# Patient Record
Sex: MALE | Race: WHITE | Employment: FULL TIME | URBAN - METROPOLITAN AREA
[De-identification: names, ages, dates, MRNs, and addresses within clinical notes are randomized per-mention and may not be internally consistent; named-entity substitution may affect disease eponyms.]

---

## 2021-08-05 ENCOUNTER — ANESTHESIA EVENT (OUTPATIENT)
Dept: EMERGENCY MEDICINE | Facility: CLINIC | Age: 31
End: 2021-08-05
Payer: COMMERCIAL

## 2021-08-05 ENCOUNTER — APPOINTMENT (OUTPATIENT)
Dept: GENERAL RADIOLOGY | Facility: CLINIC | Age: 31
End: 2021-08-05
Attending: EMERGENCY MEDICINE
Payer: COMMERCIAL

## 2021-08-05 ENCOUNTER — ANESTHESIA (OUTPATIENT)
Dept: EMERGENCY MEDICINE | Facility: CLINIC | Age: 31
End: 2021-08-05
Payer: COMMERCIAL

## 2021-08-05 ENCOUNTER — HOSPITAL ENCOUNTER (EMERGENCY)
Facility: CLINIC | Age: 31
Discharge: HOME | End: 2021-08-05
Attending: EMERGENCY MEDICINE | Admitting: EMERGENCY MEDICINE
Payer: COMMERCIAL

## 2021-08-05 VITALS
HEART RATE: 64 BPM | HEIGHT: 74 IN | DIASTOLIC BLOOD PRESSURE: 69 MMHG | RESPIRATION RATE: 10 BRPM | OXYGEN SATURATION: 98 % | BODY MASS INDEX: 30.02 KG/M2 | WEIGHT: 233.9 LBS | TEMPERATURE: 97.9 F | SYSTOLIC BLOOD PRESSURE: 124 MMHG

## 2021-08-05 DIAGNOSIS — Z87.39 HISTORY OF CLOSED SHOULDER DISLOCATION: ICD-10-CM

## 2021-08-05 DIAGNOSIS — S43.016A ANTERIOR SHOULDER DISLOCATION, INITIAL ENCOUNTER: ICD-10-CM

## 2021-08-05 PROCEDURE — 250N000011 HC RX IP 250 OP 636: Performed by: NURSE ANESTHETIST, CERTIFIED REGISTERED

## 2021-08-05 PROCEDURE — 370N000003 HC ANESTHESIA WARD SERVICE

## 2021-08-05 PROCEDURE — 99285 EMERGENCY DEPT VISIT HI MDM: CPT | Mod: 25 | Performed by: EMERGENCY MEDICINE

## 2021-08-05 PROCEDURE — 73030 X-RAY EXAM OF SHOULDER: CPT | Mod: RT

## 2021-08-05 PROCEDURE — 96376 TX/PRO/DX INJ SAME DRUG ADON: CPT | Mod: 59 | Performed by: EMERGENCY MEDICINE

## 2021-08-05 PROCEDURE — 250N000011 HC RX IP 250 OP 636: Performed by: EMERGENCY MEDICINE

## 2021-08-05 PROCEDURE — 999N000065 XR SHOULDER RIGHT PORT G/E 2 VIEWS: Mod: RT

## 2021-08-05 PROCEDURE — 23650 CLTX SHO DSLC W/MNPJ WO ANES: CPT | Mod: RT | Performed by: EMERGENCY MEDICINE

## 2021-08-05 PROCEDURE — 96374 THER/PROPH/DIAG INJ IV PUSH: CPT | Mod: 59 | Performed by: EMERGENCY MEDICINE

## 2021-08-05 RX ORDER — PROPOFOL 10 MG/ML
INJECTION, EMULSION INTRAVENOUS PRN
Status: DISCONTINUED | OUTPATIENT
Start: 2021-08-05 | End: 2021-08-05

## 2021-08-05 RX ORDER — PROPOFOL 10 MG/ML
INJECTION, EMULSION INTRAVENOUS DAILY PRN
Status: COMPLETED | OUTPATIENT
Start: 2021-08-05 | End: 2021-08-05

## 2021-08-05 RX ORDER — IBUPROFEN 200 MG
400 TABLET ORAL EVERY 4 HOURS PRN
COMMUNITY

## 2021-08-05 RX ADMIN — PROPOFOL 100 MG: 10 INJECTION, EMULSION INTRAVENOUS at 20:56

## 2021-08-05 RX ADMIN — HYDROMORPHONE HYDROCHLORIDE 1 MG: 1 INJECTION, SOLUTION INTRAMUSCULAR; INTRAVENOUS; SUBCUTANEOUS at 20:31

## 2021-08-05 RX ADMIN — MIDAZOLAM 2 MG: 1 INJECTION INTRAMUSCULAR; INTRAVENOUS at 20:54

## 2021-08-05 RX ADMIN — HYDROMORPHONE HYDROCHLORIDE 1 MG: 1 INJECTION, SOLUTION INTRAMUSCULAR; INTRAVENOUS; SUBCUTANEOUS at 19:41

## 2021-08-05 ASSESSMENT — MIFFLIN-ST. JEOR
SCORE: 2080.71
SCORE: 2013.13

## 2021-08-05 ASSESSMENT — ENCOUNTER SYMPTOMS
ENDOCRINE NEGATIVE: 1
EYES NEGATIVE: 1
ALLERGIC/IMMUNOLOGIC NEGATIVE: 1
GASTROINTESTINAL NEGATIVE: 1
PSYCHIATRIC NEGATIVE: 1
NEUROLOGICAL NEGATIVE: 1
HEMATOLOGIC/LYMPHATIC NEGATIVE: 1
CARDIOVASCULAR NEGATIVE: 1
RESPIRATORY NEGATIVE: 1
CONSTITUTIONAL NEGATIVE: 1

## 2021-08-05 NOTE — LETTER
August 5, 2021      To Whom It May Concern:      Art Díaz was seen in our Emergency Department today, 08/05/21.  He is excused from work until August 9, 2021 due to his evaluation in the emergency department.  He would benefit from further follow-up expert care-with a referral pending.  If his symptoms worsen he may need to return to be reevaluated.  This work note is valid until August 11, 2021.      Sincerely,           Gustavo Bass MD

## 2021-08-05 NOTE — ED TRIAGE NOTES
"Pt getting up from floor and \"heard a pop\" from right shoulder.  No numbness or tingling inn arm.  Hx of dislocation and feels the same.  "

## 2021-08-06 NOTE — ANESTHESIA CARE TRANSFER NOTE
Patient: Art Díaz    * No procedures listed *    Diagnosis: * No pre-op diagnosis entered *  Diagnosis Additional Information: No value filed.    Anesthesia Type:   MAC     Note:      Level of Consciousness: awake      Independent Airway: airway patency satisfactory and stable  Dentition: dentition unchanged  Vital Signs Stable: post-procedure vital signs reviewed and stable  Report to RN Given: handoff report given  Patient transferred to: Emergency Department    Handoff Report: Identifed the Patient, Identified the Reponsible Provider, Reviewed the pertinent medical history, Discussed the surgical course, Reviewed Intra-OP anesthesia mangement and issues during anesthesia, Set expectations for post-procedure period and Allowed opportunity for questions and acknowledgement of understanding      Vitals:  Vitals Value Taken Time   /82 08/05/21 2100   Temp     Pulse 68 08/05/21 2103   Resp 7 08/05/21 2103   SpO2 100 % 08/05/21 2103   Vitals shown include unvalidated device data.    Electronically Signed By: STEVE Ortiz CRNA  August 5, 2021  9:03 PM

## 2021-08-06 NOTE — ANESTHESIA PREPROCEDURE EVALUATION
Anesthesia Pre-Procedure Evaluation    Patient: Art Díaz   MRN: [id number] : 1990        Preoperative Diagnosis: * No surgery found *   Procedure :      No past medical history on file.   No past surgical history on file.   No Known Allergies   Social History     Tobacco Use     Smoking status: Not on file   Substance Use Topics     Alcohol use: Not on file      Wt Readings from Last 1 Encounters:   21 99.3 kg (219 lb)        Anesthesia Evaluation            ROS/MED HX  ENT/Pulmonary:  - neg pulmonary ROS     Neurologic:  - neg neurologic ROS     Cardiovascular:  - neg cardiovascular ROS     METS/Exercise Tolerance:     Hematologic:  - neg hematologic  ROS     Musculoskeletal:   (+) Muscular Dystrophy,     GI/Hepatic:  - neg GI/hepatic ROS     Renal/Genitourinary:  - neg Renal ROS     Endo:  - neg endo ROS     Psychiatric/Substance Use:  - neg psychiatric ROS     Infectious Disease:  - neg infectious disease ROS     Malignancy:  - neg malignancy ROS     Other:            Physical Exam    Airway        Mallampati: II   TM distance: > 3 FB   Neck ROM: full   Mouth opening: > 3 cm    Respiratory Devices and Support         Dental  no notable dental history         Cardiovascular   cardiovascular exam normal          Pulmonary   pulmonary exam normal                OUTSIDE LABS:  CBC: No results found for: WBC, HGB, HCT, PLT  BMP: No results found for: NA, POTASSIUM, CHLORIDE, CO2, BUN, CR, GLC  COAGS: No results found for: PTT, INR, FIBR  POC: No results found for: BGM, HCG, HCGS  HEPATIC: No results found for: ALBUMIN, PROTTOTAL, ALT, AST, GGT, ALKPHOS, BILITOTAL, BILIDIRECT, JULIAN  OTHER: No results found for: PH, LACT, A1C, JES, PHOS, MAG, LIPASE, AMYLASE, TSH, T4, T3, CRP, SED    Anesthesia Plan    ASA Status:  2      Anesthesia Type: MAC.   Induction: Intravenous.           Consents    Anesthesia Plan(s) and associated risks, benefits, and realistic alternatives discussed. Questions answered  and patient/representative(s) expressed understanding.     - Discussed with:  Patient         Postoperative Care            Comments:                STEVE Ortiz CRNA

## 2021-08-06 NOTE — ANESTHESIA POSTPROCEDURE EVALUATION
Patient: Art Díaz    * No procedures listed *    Diagnosis:* No pre-op diagnosis entered *  Diagnosis Additional Information: No value filed.    Anesthesia Type:  MAC    Note:  Disposition: Outpatient   Postop Pain Control: Uneventful            Sign Out: Well controlled pain   PONV: No   Neuro/Psych: Uneventful            Sign Out: Acceptable/Baseline neuro status   Airway/Respiratory: Uneventful            Sign Out: Acceptable/Baseline resp. status   CV/Hemodynamics: Uneventful            Sign Out: Acceptable CV status; No obvious hypovolemia; No obvious fluid overload   Other NRE: NONE   DID A NON-ROUTINE EVENT OCCUR? No           Last vitals:  Vitals Value Taken Time   /82 08/05/21 2100   Temp     Pulse 68 08/05/21 2103   Resp 7 08/05/21 2103   SpO2 100 % 08/05/21 2103   Vitals shown include unvalidated device data.    Electronically Signed By: STEVE Ortiz CRNA  August 5, 2021  9:04 PM

## 2021-08-06 NOTE — DISCHARGE INSTRUCTIONS
1) Your located shoulder was reduced today with procedural sedation.  Given your report of prior history of shoulder dislocation we discussed the importance of follow-up in orthopedic surgery for further definitive management to help reduce the risk of redislocation.  X imaging after reduction did show that your shoulder is now in place.    2) A referral was placed to help you with establishing primary care for do not currently have a primary care provider and to help with referral needs.  He should be called for follow-up visit appointment next 2 weeks to 1 month.    3) should remain in shoulder immobilizer at least over the next 3 days.  He is given a work note to help with follow-up care.

## 2021-08-06 NOTE — ED NOTES
In room. This is his 3 right shoulder dislocation. No current Ortho follow up. CMS is present at wrist and equal left and right. There is an obvious deformity up and forward. Denies any significant injury today. This happened when he did a pushup movement this afternoon. This happened at 16:30 today by PT report.

## 2021-08-06 NOTE — ED PROVIDER NOTES
"  History     Chief Complaint   Patient presents with     Shoulder Pain     HPI  Art Díaz is a 31 year old male who presents for evaluation of shoulder pain and discomfort with concern about a shoulder dislocation.  Patient reports prior history of dislocation.  Records indicate that he was evaluated February 1, 2019 for anterior shoulder dislocation.  Patient reports while playing with his dog he rolled and got up quickly and felt that his shoulder was dislocated.  Patient arrived by car with his significant for further care. He reports no paresthesias about the hand or forearm.  He reports no neck pain.  No other injuries.    Allergies:  No Known Allergies    Problem List:    There are no problems to display for this patient.       Past Medical History:    No past medical history on file.    Past Surgical History:    No past surgical history on file.    Family History:    No family history on file.    Social History:  Marital Status:  Single [1]  Social History     Tobacco Use     Smoking status: Not on file   Substance Use Topics     Alcohol use: Not on file     Drug use: Not on file        Medications:    ibuprofen (ADVIL/MOTRIN) 200 MG tablet          Review of Systems   Constitutional: Negative.    HENT: Negative.    Eyes: Negative.    Respiratory: Negative.    Cardiovascular: Negative.    Gastrointestinal: Negative.    Endocrine: Negative.    Genitourinary: Negative.    Musculoskeletal:        Right shoulder pain and discomfort with gross deformity   Skin: Negative.    Allergic/Immunologic: Negative.    Neurological: Negative.    Hematological: Negative.    Psychiatric/Behavioral: Negative.    All other systems reviewed and are negative.      Physical Exam   BP: (!) 140/89  Pulse: 66  Temp: 97.9  F (36.6  C)  Resp: 8  Height: 188 cm (6' 2\")  Weight: 99.3 kg (219 lb)  SpO2: 100 %      Physical Exam  HENT:      Head: Normocephalic and atraumatic.   Eyes:      Extraocular Movements: Extraocular movements " intact.      Pupils: Pupils are equal, round, and reactive to light.   Cardiovascular:      Rate and Rhythm: Normal rate and regular rhythm.      Pulses: Normal pulses.   Pulmonary:      Effort: Pulmonary effort is normal. No respiratory distress.      Breath sounds: Normal breath sounds. No stridor. No wheezing or rales.   Chest:      Chest wall: No tenderness.   Musculoskeletal:         General: Tenderness, deformity and signs of injury present. No swelling.      Right shoulder: Deformity and tenderness present. Normal range of motion.        Arms:       Cervical back: Normal range of motion and neck supple.      Right lower leg: No edema.      Left lower leg: No edema.   Skin:     Capillary Refill: Capillary refill takes less than 2 seconds.      Coloration: Skin is not jaundiced or pale.      Findings: No bruising, erythema, lesion or rash.   Neurological:      General: No focal deficit present.      Mental Status: He is alert and oriented to person, place, and time.      Cranial Nerves: No cranial nerve deficit.      Sensory: No sensory deficit.      Motor: No weakness.      Coordination: Coordination normal.      Gait: Gait normal.      Deep Tendon Reflexes: Reflexes normal.   Psychiatric:         Mood and Affect: Mood normal.         Behavior: Behavior normal.         Thought Content: Thought content normal.         Judgment: Judgment normal.         ED Course        Procedures              Critical Care time:  none              ED medications:  Medications   HYDROmorphone (DILAUDID) injection 1 mg (1 mg Intravenous Given 8/5/21 2031)   midazolam (VERSED) injection (2 mg Intravenous Given 8/5/21 2054)   propofol (DIPRIVAN) injection 10 mg/mL vial (100 mg Intravenous Given 8/5/21 2056)     ED Vitals:  Vitals:    08/05/21 2100 08/05/21 2105 08/05/21 2110 08/05/21 2115   BP: 128/82 133/78 132/78    Pulse: 76 82 80 71   Resp: 18 12 13 13   Temp:       TempSrc:       SpO2: 98% 99% 98% 98%   Weight:       Height:          Vitals:    08/05/21 2130 08/05/21 2145 08/05/21 2200 08/05/21 2215   BP: 125/73 123/79 125/84 124/69   Pulse: 69 67 74 64   Resp: 16 16 19 10   Temp:       TempSrc:       SpO2: 99% 97% 98% 98%   Weight:       Height:           ED labs and imaging:  Results for orders placed or performed during the hospital encounter of 08/05/21   Shoulder XR, 2 view, right     Status: None    Narrative    EXAM: XR SHOULDER 2 VIEW RIGHT  LOCATION: [facility]  DATE/TIME: 8/5/2021 7:52 PM    INDICATION: History of shoulder dislocation. Right shoulder pain. Evaluate for acute bony process including fracture and/or dislocation.  COMPARISON: None.      Impression    IMPRESSION: Anterior dislocation of the right glenohumeral joint. No evidence for fracture but repeat films following reduction recommended.   XR Shoulder Right Port G/E 2 Views     Status: None    Narrative    EXAM: XR SHOULDER RIGHT PORT G/E 2 VIEWS  LOCATION: [facility]  DATE/TIME: 8/5/2021 9:21 PM    INDICATION: Status post closed reduction with procedural sedation.  Evaluate for interval relocation after initial anterior shoulder dislocation vs other acute bony process  COMPARISON: 08/05/2021      Impression    IMPRESSION: Successful interval reduction of the shoulder dislocation seen on the old study. The glenohumeral joint is now in anatomic location. No evidence for fracture.              Assessments & Plan (with Medical Decision Making)   Assessment Summary and Clinical Impression: 31-year-old male right hand dominant who presented for evaluation of with report of shoulder pain due to anterior shoulder dislocation..  Prior history of anterior shoulder dislocation (x 3, last in 2019). On examination he had an obvious deformity involving the right shoulder. After informed written consent and procedural sedation the shoulder dislocation was successfully reduced without resultant fracture. After period of  observation per protocol he was placed in a shoulder immobilizer and discharged home with plan to follow-up with orthopedic surgery given recurrent episodes of surgical dislocation. Sensation about the upper extremity was preserved pre and post closed reduction.    ED course and Plan:  Reviewed the medical record.  Review evaluation on February 1, 2019.  Patient was offered IV analgesia on arrival due to discomfort and x-ray imaging was obtained.  X-ray was reviewed independently and the radiology report was also reviewed.  X-ray revealed no bony fracture however shoulder dislocation was appreciated.  See detail in the radiology report.  Patient trialed Henri's technique without success and ultimately required procedural sedation to help with reduction.  Procedural sedation was completed after informed written consent from the patient.  Sedation was completed by Thania Kim CRNA on-call.  See procedural sedation note for additional details.  Post reduction x-ray imaging was obtained to ensure interval reduction and relocation of shoulder.  Postreduction x-ray imaging was reviewed independently and the radiology report was reviewed.  Interval reduction in anatomic position without associated fracture.    After period of observation post sedation patient was discharged home with plan for follow-up care with orthopedic surgery given report that he has dislocated shoulder on at least 3 occasions further additional intervention and care as required.      Disclaimer: This note consists of symbols derived from keyboarding, dictation and/or voice recognition software. As a result, there may be errors in the script that have gone undetected. Please consider this when interpreting information found in this chart.  I have reviewed the nursing notes.    I have reviewed the findings, diagnosis, plan and need for follow up with the patient.       New Prescriptions    No medications on file       Final diagnoses:    Anterior shoulder dislocation, initial encounter   History of closed shoulder dislocation       8/5/2021   [facility] EMERGENCY DEPT     Arpit Bass MD  08/06/21 0111

## 2022-02-06 ENCOUNTER — HEALTH MAINTENANCE LETTER (OUTPATIENT)
Age: 32
End: 2022-02-06

## 2022-10-03 ENCOUNTER — HEALTH MAINTENANCE LETTER (OUTPATIENT)
Age: 32
End: 2022-10-03

## 2023-02-11 ENCOUNTER — HEALTH MAINTENANCE LETTER (OUTPATIENT)
Age: 33
End: 2023-02-11

## 2024-03-09 ENCOUNTER — HEALTH MAINTENANCE LETTER (OUTPATIENT)
Age: 34
End: 2024-03-09

## (undated) RX ORDER — PROPOFOL 10 MG/ML
INJECTION, EMULSION INTRAVENOUS
Status: DISPENSED
Start: 2021-08-05